# Patient Record
Sex: FEMALE | Race: BLACK OR AFRICAN AMERICAN | NOT HISPANIC OR LATINO | Employment: STUDENT | URBAN - METROPOLITAN AREA
[De-identification: names, ages, dates, MRNs, and addresses within clinical notes are randomized per-mention and may not be internally consistent; named-entity substitution may affect disease eponyms.]

---

## 2023-03-13 ENCOUNTER — TELEMEDICINE (OUTPATIENT)
Dept: PRIMARY CARE | Facility: CLINIC | Age: 23
End: 2023-03-13
Payer: COMMERCIAL

## 2023-03-13 DIAGNOSIS — B30.9 VIRAL CONJUNCTIVITIS OF BOTH EYES: Primary | ICD-10-CM

## 2023-03-13 PROCEDURE — 99213 OFFICE O/P EST LOW 20 MIN: CPT | Performed by: STUDENT IN AN ORGANIZED HEALTH CARE EDUCATION/TRAINING PROGRAM

## 2023-03-13 RX ORDER — AZELASTINE HYDROCHLORIDE 0.5 MG/ML
1 SOLUTION/ DROPS OPHTHALMIC 2 TIMES DAILY
Qty: 6 ML | Refills: 0 | Status: SHIPPED | OUTPATIENT
Start: 2023-03-13 | End: 2023-07-11

## 2023-03-13 RX ORDER — ASCORBIC ACID 125 MG
1 TABLET,CHEWABLE ORAL DAILY
COMMUNITY
Start: 2021-08-04

## 2023-03-14 PROBLEM — H52.10 ERROR, REFRACTIVE, MYOPIA: Status: ACTIVE | Noted: 2023-03-14

## 2023-03-14 PROBLEM — H52.203 MYOPIA OF BOTH EYES WITH ASTIGMATISM: Status: ACTIVE | Noted: 2023-03-14

## 2023-03-14 PROBLEM — D64.9 MILD ANEMIA: Status: ACTIVE | Noted: 2023-03-14

## 2023-03-14 PROBLEM — E55.9 VITAMIN D DEFICIENCY: Status: ACTIVE | Noted: 2023-03-14

## 2023-03-14 PROBLEM — R63.4 WEIGHT LOSS, UNINTENTIONAL: Status: ACTIVE | Noted: 2023-03-14

## 2023-03-14 PROBLEM — H52.13 MYOPIA OF BOTH EYES WITH ASTIGMATISM: Status: ACTIVE | Noted: 2023-03-14

## 2023-03-14 ASSESSMENT — ENCOUNTER SYMPTOMS
EYE REDNESS: 1
EYE DISCHARGE: 1
EYE PAIN: 0
SORE THROAT: 0
EYE ITCHING: 1
CHILLS: 0
FEVER: 0

## 2023-03-14 NOTE — ASSESSMENT & PLAN NOTE
-volume of discharge and only mild injection appears consistent with a viral conjunctivitis  -no visual symptoms or eye pain concerning for more serious ophthalmologic process  -azelastine eye drops for comfort  -continue to monitor for symptom improvement

## 2023-03-14 NOTE — PROGRESS NOTES
Subjective   Patient ID:  Verbal consent was requested and obtained from patient on this date for a telehealth visit.    Keri Roque is a 21yo female who presents for a virtual visit due to concern for eye discomfort. She reports that yesterday she developed itching in both her eyes. When she woke up this morning her eyes were crusted shut and her sister told her that both her eyes were red. She endorses persistent bilateral eye itching. She also notes scant discharge from her eyes throughout the day today. No eye pain, no vision changes, She wears glasses but does not wear contacts. She has a sibling who has had a mild sore throat but otherwise no sick contacts. Denies any allergy history.        Review of Systems   Constitutional:  Negative for chills and fever.   HENT:  Negative for congestion, ear pain and sore throat.    Eyes:  Positive for discharge, redness and itching. Negative for pain and visual disturbance.       Objective   There were no vitals taken for this visit. There is no height or weight on file to calculate BMI.    Physical Exam  Constitutional:       General: She is not in acute distress.  Eyes:      General: Lids are normal. Gaze aligned appropriately. No scleral icterus.        Right eye: Discharge (crusted discharge over medial canthus) present.         Left eye: No discharge.      Extraocular Movements: Extraocular movements intact.      Conjunctiva/sclera:      Right eye: Right conjunctiva is injected. No exudate or hemorrhage.     Left eye: Left conjunctiva is injected. No exudate or hemorrhage.  Neurological:      Mental Status: She is alert.       Assessment/Plan   Problem List Items Addressed This Visit          Infectious/Inflammatory    Viral conjunctivitis of both eyes - Primary     -volume of discharge and only mild injection appears consistent with a viral conjunctivitis  -no visual symptoms or eye pain concerning for more serious ophthalmologic process  -azelastine eye drops for  comfort  -continue to monitor for symptom improvement         Relevant Medications    azelastine (Optivar) 0.05 % ophthalmic solution     Follow up in 5 months for annual physical.    Patient discussed with Dr. Pinedo.    Gerard Cowan MD   PGY-2  [facility]

## 2023-03-16 NOTE — PROGRESS NOTES
I reviewed the resident/fellow's documentation and discussed the patient with the resident/fellow. I agree with the resident/fellow's medical decision making as documented in the note.     Jaimie Pinedo MD